# Patient Record
Sex: MALE | Race: WHITE | URBAN - METROPOLITAN AREA
[De-identification: names, ages, dates, MRNs, and addresses within clinical notes are randomized per-mention and may not be internally consistent; named-entity substitution may affect disease eponyms.]

---

## 2023-08-31 ENCOUNTER — OFFICE (OUTPATIENT)
Dept: URBAN - METROPOLITAN AREA CLINIC 109 | Facility: CLINIC | Age: 53
Setting detail: OPHTHALMOLOGY
End: 2023-08-31
Payer: MEDICARE

## 2023-08-31 DIAGNOSIS — H25.11: ICD-10-CM

## 2023-08-31 PROCEDURE — 92004 COMPRE OPH EXAM NEW PT 1/>: CPT | Performed by: OPHTHALMOLOGY

## 2023-08-31 ASSESSMENT — REFRACTION_MANIFEST
OD_SPHERE: -6.75
OS_AXIS: 123
OS_VA1: 20/20
OD_AXIS: 180
OS_SPHERE: +0.25
OD_CYLINDER: -0.50
OD_VA1: 20/50-
OS_CYLINDER: -0.75

## 2023-08-31 ASSESSMENT — CONFRONTATIONAL VISUAL FIELD TEST (CVF)
OD_FINDINGS: FULL
OS_FINDINGS: FULL

## 2023-08-31 ASSESSMENT — REFRACTION_AUTOREFRACTION
OS_SPHERE: +0.25
OD_AXIS: 178
OS_AXIS: 123
OD_CYLINDER: -0.50
OS_CYLINDER: -0.75
OD_SPHERE: -6.75

## 2023-08-31 ASSESSMENT — SPHEQUIV_DERIVED
OD_SPHEQUIV: -7
OS_SPHEQUIV: -0.125
OS_SPHEQUIV: -0.125
OD_SPHEQUIV: -7

## 2023-08-31 ASSESSMENT — TONOMETRY: OD_IOP_MMHG: 10

## 2023-08-31 ASSESSMENT — VISUAL ACUITY
OD_BCVA: 20/25-2
OS_BCVA: 20/100

## 2024-07-15 ENCOUNTER — OFFICE (OUTPATIENT)
Dept: URBAN - METROPOLITAN AREA CLINIC 109 | Facility: CLINIC | Age: 54
Setting detail: OPHTHALMOLOGY
End: 2024-07-15
Payer: COMMERCIAL

## 2024-07-15 ENCOUNTER — DOCTOR'S OFFICE (OUTPATIENT)
Age: 54
Setting detail: OPHTHALMOLOGY
End: 2024-07-15
Payer: COMMERCIAL

## 2024-07-15 DIAGNOSIS — H33.21: ICD-10-CM

## 2024-07-15 DIAGNOSIS — H35.371: ICD-10-CM

## 2024-07-15 DIAGNOSIS — H33.8: ICD-10-CM

## 2024-07-15 DIAGNOSIS — H35.361: ICD-10-CM

## 2024-07-15 PROCEDURE — 92012 INTRM OPH EXAM EST PATIENT: CPT | Performed by: OPHTHALMOLOGY

## 2024-07-15 PROCEDURE — 92201 OPSCPY EXTND RTA DRAW UNI/BI: CPT | Performed by: OPHTHALMOLOGY

## 2024-07-15 PROCEDURE — 76512 OPH US DX B-SCAN: CPT | Performed by: OPHTHALMOLOGY

## 2024-07-15 PROCEDURE — 92014 COMPRE OPH EXAM EST PT 1/>: CPT | Performed by: OPHTHALMOLOGY

## 2024-07-15 PROCEDURE — 92134 CPTRZ OPH DX IMG PST SGM RTA: CPT | Performed by: OPHTHALMOLOGY

## 2024-07-15 ASSESSMENT — CONFRONTATIONAL VISUAL FIELD TEST (CVF)
OD_FINDINGS: FULL
OS_FINDINGS: FULL

## 2024-07-22 ENCOUNTER — DOCTOR'S OFFICE (OUTPATIENT)
Age: 54
Setting detail: OPHTHALMOLOGY
End: 2024-07-22
Payer: COMMERCIAL

## 2024-07-22 DIAGNOSIS — H33.21: ICD-10-CM

## 2024-07-22 DIAGNOSIS — H35.371: ICD-10-CM

## 2024-07-22 DIAGNOSIS — H35.361: ICD-10-CM

## 2024-07-22 PROCEDURE — 92134 CPTRZ OPH DX IMG PST SGM RTA: CPT | Performed by: OPHTHALMOLOGY

## 2024-07-22 PROCEDURE — 92012 INTRM OPH EXAM EST PATIENT: CPT | Performed by: OPHTHALMOLOGY

## 2024-07-29 ENCOUNTER — DOCTOR'S OFFICE (OUTPATIENT)
Age: 54
Setting detail: OPHTHALMOLOGY
End: 2024-07-29
Payer: COMMERCIAL

## 2024-07-29 DIAGNOSIS — H33.21: ICD-10-CM

## 2024-07-29 DIAGNOSIS — H35.361: ICD-10-CM

## 2024-07-29 DIAGNOSIS — H35.371: ICD-10-CM

## 2024-07-29 PROCEDURE — 92134 CPTRZ OPH DX IMG PST SGM RTA: CPT | Performed by: OPHTHALMOLOGY

## 2024-07-29 PROCEDURE — 92012 INTRM OPH EXAM EST PATIENT: CPT | Performed by: OPHTHALMOLOGY

## 2024-08-14 ENCOUNTER — DOCTOR'S OFFICE (OUTPATIENT)
Age: 54
Setting detail: OPHTHALMOLOGY
End: 2024-08-14
Payer: COMMERCIAL

## 2024-08-14 DIAGNOSIS — H33.21: ICD-10-CM

## 2024-08-14 DIAGNOSIS — H35.371: ICD-10-CM

## 2024-08-14 DIAGNOSIS — H35.361: ICD-10-CM

## 2024-08-14 PROCEDURE — 92134 CPTRZ OPH DX IMG PST SGM RTA: CPT | Performed by: OPHTHALMOLOGY

## 2024-08-14 PROCEDURE — 92235 FLUORESCEIN ANGRPH MLTIFRAME: CPT | Performed by: OPHTHALMOLOGY

## 2024-08-14 PROCEDURE — 92012 INTRM OPH EXAM EST PATIENT: CPT | Performed by: OPHTHALMOLOGY

## 2024-08-14 ASSESSMENT — CONFRONTATIONAL VISUAL FIELD TEST (CVF)
OD_FINDINGS: FULL
OS_FINDINGS: FULL

## 2024-09-30 ENCOUNTER — APPOINTMENT (OUTPATIENT)
Dept: OPHTHALMOLOGY | Facility: CLINIC | Age: 54
End: 2024-09-30
Payer: COMMERCIAL

## 2024-09-30 ENCOUNTER — NON-APPOINTMENT (OUTPATIENT)
Age: 54
End: 2024-09-30

## 2024-09-30 PROCEDURE — 92004 COMPRE OPH EXAM NEW PT 1/>: CPT

## 2024-09-30 PROCEDURE — 76512 OPH US DX B-SCAN: CPT | Mod: RT

## 2024-09-30 PROCEDURE — 92250 FUNDUS PHOTOGRAPHY W/I&R: CPT

## 2024-10-03 ENCOUNTER — APPOINTMENT (OUTPATIENT)
Dept: OPHTHALMOLOGY | Facility: CLINIC | Age: 54
End: 2024-10-03

## 2024-11-18 ENCOUNTER — APPOINTMENT (OUTPATIENT)
Dept: OPHTHALMOLOGY | Facility: CLINIC | Age: 54
End: 2024-11-18
Payer: COMMERCIAL

## 2024-11-18 ENCOUNTER — NON-APPOINTMENT (OUTPATIENT)
Age: 54
End: 2024-11-18

## 2024-11-18 PROCEDURE — 92014 COMPRE OPH EXAM EST PT 1/>: CPT

## 2024-11-18 PROCEDURE — 92134 CPTRZ OPH DX IMG PST SGM RTA: CPT

## 2024-12-05 RX ORDER — 0.9 % SODIUM CHLORIDE 0.9 %
1000 INTRAVENOUS SOLUTION INTRAVENOUS
Refills: 0 | Status: DISCONTINUED | OUTPATIENT
Start: 2024-12-11 | End: 2024-12-11

## 2024-12-10 NOTE — ASU PATIENT PROFILE, ADULT - NS PREOP UNDERSTANDS INFO
No solid food/dairy/candy/gum after midnight tonight; water allowed before 05:30am tomorrow; patient reminded to come with photo ID/insurance/credit card; dress in comfortable clothes; no jewelries/contact lens/valuable; no smoking/alcohol drinking/recreational drug use today; escort to have photo ID; address and callback number was given/yes

## 2024-12-11 ENCOUNTER — OUTPATIENT (OUTPATIENT)
Dept: OUTPATIENT SERVICES | Facility: HOSPITAL | Age: 54
LOS: 1 days | Discharge: HOME | End: 2024-12-11
Payer: COMMERCIAL

## 2024-12-11 VITALS
WEIGHT: 195.55 LBS | OXYGEN SATURATION: 97 % | TEMPERATURE: 99 F | HEART RATE: 79 BPM | HEIGHT: 68 IN | SYSTOLIC BLOOD PRESSURE: 151 MMHG | RESPIRATION RATE: 16 BRPM | DIASTOLIC BLOOD PRESSURE: 90 MMHG

## 2024-12-11 VITALS
SYSTOLIC BLOOD PRESSURE: 119 MMHG | DIASTOLIC BLOOD PRESSURE: 71 MMHG | RESPIRATION RATE: 16 BRPM | TEMPERATURE: 98 F | OXYGEN SATURATION: 96 %

## 2024-12-11 DIAGNOSIS — Z98.49 CATARACT EXTRACTION STATUS, UNSPECIFIED EYE: Chronic | ICD-10-CM

## 2024-12-11 PROCEDURE — 88305 TISSUE EXAM BY PATHOLOGIST: CPT | Mod: 26

## 2024-12-11 PROCEDURE — 88108 CYTOPATH CONCENTRATE TECH: CPT | Mod: 26

## 2024-12-11 DEVICE — IMP SIL SLEEVE STYLE 70: Type: IMPLANTABLE DEVICE | Site: RIGHT | Status: FUNCTIONAL

## 2024-12-11 DEVICE — COMP SCLERAL BUCKLE NO 41: Type: IMPLANTABLE DEVICE | Site: RIGHT | Status: FUNCTIONAL

## 2024-12-11 DEVICE — SOL PERFLOURON 5ML: Type: IMPLANTABLE DEVICE | Site: RIGHT | Status: FUNCTIONAL

## 2024-12-11 DEVICE — RETINAL  ADATOSIL OIL 10CC: Type: IMPLANTABLE DEVICE | Site: RIGHT | Status: FUNCTIONAL

## 2024-12-11 DEVICE — LASER PROBE 25G CONSTELLATION: Type: IMPLANTABLE DEVICE | Site: RIGHT | Status: FUNCTIONAL

## 2024-12-11 RX ORDER — 0.9 % SODIUM CHLORIDE 0.9 %
500 INTRAVENOUS SOLUTION INTRAVENOUS
Refills: 0 | Status: DISCONTINUED | OUTPATIENT
Start: 2024-12-11 | End: 2024-12-11

## 2024-12-11 RX ORDER — GLUCOSAMINE SULFATE DIPOT CHLR 500 MG
1 CAPSULE ORAL
Refills: 0 | DISCHARGE

## 2024-12-11 RX ORDER — EPLERENONE 25 MG/1
2 TABLET ORAL
Refills: 0 | DISCHARGE

## 2024-12-11 RX ORDER — ACETAMINOPHEN 500MG 500 MG/1
650 TABLET, COATED ORAL ONCE
Refills: 0 | Status: DISCONTINUED | OUTPATIENT
Start: 2024-12-11 | End: 2024-12-11

## 2024-12-11 RX ORDER — LEVOTHYROXINE SODIUM 150 MCG
1 TABLET ORAL
Refills: 0 | DISCHARGE

## 2024-12-11 RX ORDER — ATROPINE SULFATE 1 %
1 DROPS OPHTHALMIC (EYE)
Refills: 0 | Status: COMPLETED | OUTPATIENT
Start: 2024-12-11 | End: 2024-12-11

## 2024-12-11 RX ADMIN — Medication 1 DROP(S): at 08:40

## 2024-12-11 RX ADMIN — Medication 40 MILLILITER(S): at 12:08

## 2024-12-11 RX ADMIN — Medication 1 DROP(S): at 08:50

## 2024-12-11 RX ADMIN — Medication 1 DROP(S): at 08:45

## 2024-12-11 NOTE — PRE-ANESTHESIA EVALUATION ADULT - NSANTHOSAYNRD_GEN_A_CORE
No. CLARKE screening performed.  STOP BANG Legend: 0-2 = LOW Risk; 3-4 = INTERMEDIATE Risk; 5-8 = HIGH Risk

## 2024-12-11 NOTE — OPERATIVE REPORT - NSICDXBRIEFPROCEDURE_GEN_ALL_CORE_FT
PROCEDURES:  Vitrectomy, pars plana approach, using 25 gauge instruments, with scleral buckle 11-Dec-2024 12:31:50  Louis Leach

## 2024-12-11 NOTE — PRE-ANESTHESIA EVALUATION ADULT - NSPROPOSEDPROCEDFT_GEN_ALL_CORE
right eye repair of complex retinal detachment via pars plana vitrectomy with scleral buckling and subretinal drainage

## 2024-12-11 NOTE — OPERATIVE REPORT - OPERATIVE RPOSRT DETAILS
VITRECTOMY for a Primary Retinal Detachment    PATIENT NAME: LEANNA ONOFRE    ATTENDING: Louis Leach MD PhD    PREOPERATIVE DIAGNOSIS(ES):  Rhegmatogenous Retinal Detachment, right eye     POSTOPERATIVE DIAGNOSIS(ES): Rhegmatogenous Retinal Detachment, right eye     PROCEDURE: Pars plana vitrectomy, endolaser, air-fluid exchange, 5000 centistoke silicone oil, encircling scleral buckle, all of the right eye     INDICATIONS:       The patient is a 54y year old Male with a history of a retinal detachment in the operative eye. After a detailed review of the risks, benefits and alternatives of the procedure, including but not limited to bleeding, infection, inflammation, retinal detachment, loss of vision, loss of eye, double vision, need for further surgery, and systemic risks of anesthesia and surgery, informed consent was obtained and the patient elected to proceed with the surgery.     PROCEDURE:       On the day of surgery, after clearance by medicine and anesthesia, the patient was brought to the operating room in stable condition. After verifying the correct surgical site, the patient was placed in the supine position. Intravenous sedation was provided by the anesthesia team.  After a brief time-out, a 1:1 mixture of 2% lidocaine and 0.75% Marcaine was injected in a retrobulbar fashion behind the operative eye. The patient was then prepped and draped in the usual sterile fashion.  Eyelashes were draped out of the operative field and a stainless steel eyelid speculum was placed in the operative eye.  A time-out was performed verifying correct patient, procedure, site, positioning and special equipment prior to starting the case.     First, a 360 degree peritomy was created with Wesley scissors and conjunctival forceps about 4mm behind the limbus. Tenon's space was entered with curved Rafael's tenotomy scissors. The rectus muscles were bridled with 3-0 silk. Care was taken not to split the muscles or involve the obliques. The quadrants were then inspected for scleromalacia. None was found. Next, the #41 band that had been soaking in Gentamicin was passed under the rectus muscles, and then sutured to the sclera with 5-0 Nylon mattress sutures in each quadrant to leave the anterior edge of the buckle about 2mm behind the muscles insertion to support the vitreous base. The #70 sleeve was placed around the buckle ends in the superonasal quadrant, and the buckle temporarily tightened. Intraocular pressure was checked by palpation and found to be physiological.     Next, the operating microscope was brought over the operating field.    A 25-gauge microcannula was placed in the inferotemporal quadrant in a beveled fashion 4 mm posterior to the limbus, as the patient was phakic. The infusion cannula was cleared of air, inserted into the microcannula, confirmed to be in the correct position within the vitreous cavity by direct visualization through the dilated pupil with the light pipe and then turned on under direct visualization. Two additional microcannulas were placed superonasally and superotemporally in a similar fashion. The vitrectomy hand piece and light were then inserted into the eye and the anterior vitreous was cleared under direct visualization.     Then, under indirect wide field visualization, a core and peripheral vitrectomy was performed. The posterior hyaloid was found to be .  A core vitrectomy was performed, followd by instillation of PFO to stabilize the very bullously detached retina. The vitreous was excised 360 degrees to the posterior vitreous base and in addition to the break seen at 6 o'clock preoperatively, an additional break at 1 o'clock was found. More perfluorocarbon liquid was placed and the retina was noted to flatten nicely, with Schlieren appearing from inferiorly.  More peripheral vitrectomy was performed. 360 degree scleral depression was used to confirm there were no additional breaks other than the ones seen pre-operatively. Diathermy was used to edgar the edges of the breaks.  Endophotocoagulation was then placed on the posterior edges of the retinal breaks identified.     Now, the buckle was brought to its final height, and the ends cut.    A backflush was then used to perform an air-fluid exchange, taking care to drain the subretinal fluid from the most posterior break. Following the air-fluid exchange, additional laser was placed on the anterior aspects of the breaks with endophotocoagulation, as well as the retina anterior to the buckle.       Following the air-fluid exchange, 5000 centistoke silicone oil was instilled. Then, the microcannulas were removed and the sclerotomies were sutured closed using 7-0 vicryl. All sclerotomies remained oiltight and the eye remained at a physiologic pressure by palpation.     Then, Tenon's was reapproximated in all 4 quadrants with interrupted 7-0 Vicryl, and the conjunctival peritomy closed with a 7-0 Vicryl running suture.     The eyelid speculum was removed from the eye. Betadine was cleaned from around the eye. A topical steroid/antibiotic cream was placed on the eye, followed by a patch and a clear plastic shield. The patient tolerated the procedure well and left the operating room in stable condition.

## 2025-02-03 NOTE — ASU PATIENT PROFILE, ADULT - REASON FOR ADMISSION, PROFILE
Strong suspicion for COPD. Patient never had PFTs. PFTs ordered but patient does not want to schedule.   -has been on triple therapy but unsure any benefit with his dyspnea primarily with stairs  -refill albuterol Rx  -discussed if not symptom improvement with inhalers, no clear benefit for him.   Right Eye Retinal detachment

## (undated) DEVICE — DRSG TAPE MICROPORE SURGICAL TAPE .5"X10 YDS TAN

## (undated) DEVICE — DRAPE MICROSCOPE KNOB COVER SMALL (2 PCS)

## (undated) DEVICE — Device

## (undated) DEVICE — SUT SILK 3-0 18" TIES

## (undated) DEVICE — SUT VICRYL 7-0 18" TG160-8 DA

## (undated) DEVICE — GLV 7.5 PROTEXIS (WHITE)

## (undated) DEVICE — WARMING BLANKET LOWER ADULT

## (undated) DEVICE — SUT NYLON 10-0 12" CU-5

## (undated) DEVICE — SUT NYLON 5-0 18" R-5

## (undated) DEVICE — CONSTELLATION VFC PAK

## (undated) DEVICE — SUT VICRYL 8-0 12" TG140-8 DA

## (undated) DEVICE — BACKFLUSH SOFT TIP 25G

## (undated) DEVICE — PACK VITRECTOMY  LF

## (undated) DEVICE — CANNULA SURGICAL SPECIALTIES DUAL BORE 25G

## (undated) DEVICE — VENODYNE/SCD SLEEVE CALF MEDIUM